# Patient Record
Sex: FEMALE | Race: WHITE
[De-identification: names, ages, dates, MRNs, and addresses within clinical notes are randomized per-mention and may not be internally consistent; named-entity substitution may affect disease eponyms.]

---

## 2022-08-30 ENCOUNTER — HOSPITAL ENCOUNTER (EMERGENCY)
Dept: HOSPITAL 4 - SED | Age: 42
Discharge: HOME | End: 2022-08-30
Payer: SELF-PAY

## 2022-08-30 VITALS — SYSTOLIC BLOOD PRESSURE: 133 MMHG

## 2022-08-30 VITALS — SYSTOLIC BLOOD PRESSURE: 134 MMHG

## 2022-08-30 VITALS — WEIGHT: 164 LBS | HEIGHT: 65 IN | BODY MASS INDEX: 27.32 KG/M2

## 2022-08-30 DIAGNOSIS — M54.2: ICD-10-CM

## 2022-08-30 DIAGNOSIS — R59.1: Primary | ICD-10-CM

## 2022-08-30 DIAGNOSIS — Z79.899: ICD-10-CM

## 2022-08-30 PROCEDURE — 96372 THER/PROPH/DIAG INJ SC/IM: CPT

## 2022-08-30 PROCEDURE — 99284 EMERGENCY DEPT VISIT MOD MDM: CPT

## 2022-08-30 NOTE — NUR
Patient given written and verbal discharge instructions and verbalizes 
understanding.  ER MD discussed with patient the results and treatment 
provided. Patient in stable condition. ID arm band removed. 

Rx of Norco, Penicillin, and Prednisone given. Patient educated on pain 
management and to follow up with PMD. Pain Scale 0/10.

Opportunity for questions provided and answered. Medication side effect fact 
sheet provided.

## 2022-08-30 NOTE — NUR
PT HERE C/O OF SORETHROAT X3 DAYS, PT DENIES FEVER AND COUGH. PT STATED THAT 
SHE HAS INTERMITTENT NAUSEA. DENIES VOMITING.



HX:NECK/THROAT SURGERY



PT AAOX4, NO SOB NOTEDAND NOT IN ANY DISTRESS. PT AMBULATED TO RM5 WITH STAEDY 
GAIT. SEENAND EXAMINE BY DR. NEIL

## 2022-09-22 ENCOUNTER — HOSPITAL ENCOUNTER (EMERGENCY)
Dept: HOSPITAL 4 - SED | Age: 42
Discharge: HOME | End: 2022-09-22
Payer: SELF-PAY

## 2022-09-22 VITALS — BODY MASS INDEX: 30.12 KG/M2 | WEIGHT: 170 LBS | HEIGHT: 63 IN

## 2022-09-22 VITALS — SYSTOLIC BLOOD PRESSURE: 125 MMHG

## 2022-09-22 VITALS — SYSTOLIC BLOOD PRESSURE: 122 MMHG

## 2022-09-22 DIAGNOSIS — Z20.822: ICD-10-CM

## 2022-09-22 DIAGNOSIS — Z79.899: ICD-10-CM

## 2022-09-22 DIAGNOSIS — R06.02: ICD-10-CM

## 2022-09-22 DIAGNOSIS — R50.9: ICD-10-CM

## 2022-09-22 DIAGNOSIS — U07.1: Primary | ICD-10-CM

## 2022-09-22 DIAGNOSIS — R05.9: ICD-10-CM

## 2022-09-22 LAB
ALBUMIN SERPL BCP-MCNC: 3 G/DL (ref 3.4–4.8)
ALT SERPL W P-5'-P-CCNC: 29 U/L (ref 12–78)
ANION GAP SERPL CALCULATED.3IONS-SCNC: 9 MMOL/L (ref 5–15)
AST SERPL W P-5'-P-CCNC: 18 U/L (ref 10–37)
BASOPHILS # BLD AUTO: 0 K/UL (ref 0–0.2)
BASOPHILS NFR BLD AUTO: 0.2 % (ref 0–2)
BILIRUB SERPL-MCNC: 0.1 MG/DL (ref 0–1)
BUN SERPL-MCNC: 8 MG/DL (ref 8–21)
CALCIUM SERPL-MCNC: 8.3 MG/DL (ref 8.4–11)
CHLORIDE SERPL-SCNC: 104 MMOL/L (ref 98–107)
CREAT SERPL-MCNC: 0.75 MG/DL (ref 0.55–1.3)
EOSINOPHIL # BLD AUTO: 0 K/UL (ref 0–0.4)
EOSINOPHIL NFR BLD AUTO: 0.1 % (ref 0–4)
ERYTHROCYTE [DISTWIDTH] IN BLOOD BY AUTOMATED COUNT: 13.9 % (ref 9–15)
GFR SERPL CREATININE-BSD FRML MDRD: 108 ML/MIN (ref 90–?)
GLUCOSE SERPL-MCNC: 103 MG/DL (ref 70–99)
HCG SERPL-ACNC: 0 MIU/ML (ref 0–6)
HCT VFR BLD AUTO: 28.3 % (ref 36–48)
LYMPHOCYTES # BLD AUTO: 1.2 K/UL (ref 1–5.5)
LYMPHOCYTES NFR BLD AUTO: 16.8 % (ref 20.5–51.5)
MCV RBC AUTO: 83 FL (ref 79–98)
MONOCYTES # BLD MANUAL: 0.6 K/UL (ref 0–1)
MONOCYTES # BLD MANUAL: 8.3 % (ref 1.7–9.3)
NEUTROPHILS # BLD AUTO: 5.4 K/UL (ref 1.8–7.7)
NEUTROPHILS NFR BLD AUTO: 74.6 % (ref 40–70)
PLATELET # BLD AUTO: 283 K/UL (ref 130–430)
POTASSIUM SERPL-SCNC: 2.8 MMOL/L (ref 3.5–5.1)
RBC # BLD AUTO: 3.41 MIL/UL (ref 4.2–6.2)
WBC # BLD AUTO: 7.2 K/UL (ref 4.8–10.8)

## 2022-09-22 PROCEDURE — 84702 CHORIONIC GONADOTROPIN TEST: CPT

## 2022-09-22 PROCEDURE — 36415 COLL VENOUS BLD VENIPUNCTURE: CPT

## 2022-09-22 PROCEDURE — 94640 AIRWAY INHALATION TREATMENT: CPT

## 2022-09-22 PROCEDURE — 71045 X-RAY EXAM CHEST 1 VIEW: CPT

## 2022-09-22 PROCEDURE — 87426 SARSCOV CORONAVIRUS AG IA: CPT

## 2022-09-22 PROCEDURE — 99284 EMERGENCY DEPT VISIT MOD MDM: CPT

## 2022-09-22 PROCEDURE — 96372 THER/PROPH/DIAG INJ SC/IM: CPT

## 2022-09-22 PROCEDURE — 80053 COMPREHEN METABOLIC PANEL: CPT

## 2022-09-22 PROCEDURE — 85025 COMPLETE CBC W/AUTO DIFF WBC: CPT
